# Patient Record
Sex: FEMALE | Race: OTHER | HISPANIC OR LATINO | URBAN - METROPOLITAN AREA
[De-identification: names, ages, dates, MRNs, and addresses within clinical notes are randomized per-mention and may not be internally consistent; named-entity substitution may affect disease eponyms.]

---
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
## 2023-11-13 NOTE — PROCEDURE: TREATMENT REGIMEN
The ECG revealed sinus bradycardia. The ECG rate was 56 bpm.
Initiate Treatment: Clindamycin once daily
Otc Regimen: Wash with PanOxyl 4% once daily. Caution: may bleach fabrics
Plan: Patient purchased Tretinoin 0.05% in the office
Continue Regimen: Tretinoin 0.05%
Detail Level: Zone